# Patient Record
Sex: MALE | Race: WHITE | NOT HISPANIC OR LATINO | URBAN - NONMETROPOLITAN AREA
[De-identification: names, ages, dates, MRNs, and addresses within clinical notes are randomized per-mention and may not be internally consistent; named-entity substitution may affect disease eponyms.]

---

## 2019-11-05 ENCOUNTER — IMPORTED ENCOUNTER (OUTPATIENT)
Dept: URBAN - NONMETROPOLITAN AREA CLINIC 1 | Facility: CLINIC | Age: 79
End: 2019-11-05

## 2019-11-05 PROCEDURE — 92133 CPTRZD OPH DX IMG PST SGM ON: CPT

## 2019-11-05 PROCEDURE — 92014 COMPRE OPH EXAM EST PT 1/>: CPT

## 2019-11-05 NOTE — PATIENT DISCUSSION
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 - VF done last visit reliable OU. OD shows superior rim artifact and OS superior arcuae scotoma. - OCT done today good RNFL noted. - Gonio done today grade IV with light pimgment OU. - IOP is 14 OU today. - Cup/disc ratio noted at 0.6 OU. 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 - Continue without drops at this time. - Continue to monitor for now for changes. - Patient has not been seen in 1-1/2 years. At his last visit with Dr. Shante Crawford he insisted on 1 year and they ""setteled"" for 9 month f/u each year. Again stressed importance with patient again today. 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 I am unsure of his ""friends"" status but we discussed the different lens options that were available at the time of his surgery and that I would not recommend a switch at this time. Patient expressed understanding. -  Will continue to monitor for now. MANDEEP OU - Educated patient on condition. - Appears stable without complaint today. - Continue to use ATs PRN (Systane Balance). - Monitor. Chorioretinal Scar OS: - Educated patient on findings/condition. - Appears stable at this time without change. - Continue to monitor. PVD OU: - Educated patient on condition. - Appears stable at this time. - Signs/symptoms of RD discussed with patient as well. He is to call or come in ASAP if changes are noted in vision from today. - Continue close monitoring. Dermatochalasis OU-  Discussed findings w/ pt-  No superior field of vision complaint noted-  Monitor PRN; 's Notes: MR BHAKTA - 3/2013DFE  11/5/19VF  5/9/17OCT disc 11/5/19[facility] 7/19/16Gonio  2/13/18**Patient is a Obed Last! !**"

## 2022-04-09 ASSESSMENT — PACHYMETRY
OD_CT_UM: 502; ADJ: VTHIN
OS_CT_UM: 511; ADJ: VTHIN

## 2022-04-09 ASSESSMENT — TONOMETRY
OD_IOP_MMHG: 14
OS_IOP_MMHG: 14

## 2022-04-09 ASSESSMENT — VISUAL ACUITY: OS_CC: 20/20
